# Patient Record
Sex: MALE | Race: WHITE | NOT HISPANIC OR LATINO | URBAN - NONMETROPOLITAN AREA
[De-identification: names, ages, dates, MRNs, and addresses within clinical notes are randomized per-mention and may not be internally consistent; named-entity substitution may affect disease eponyms.]

---

## 2017-01-02 PROBLEM — E11.3293: Noted: 2017-01-02

## 2017-01-02 PROBLEM — H25.13: Noted: 2020-01-10

## 2017-01-02 PROBLEM — H52.4: Noted: 2017-01-02

## 2019-02-07 ENCOUNTER — IMPORTED ENCOUNTER (OUTPATIENT)
Dept: URBAN - NONMETROPOLITAN AREA CLINIC 1 | Facility: CLINIC | Age: 64
End: 2019-02-07

## 2019-02-07 PROCEDURE — 92014 COMPRE OPH EXAM EST PT 1/>: CPT

## 2019-02-07 PROCEDURE — 92015 DETERMINE REFRACTIVE STATE: CPT

## 2019-02-07 NOTE — PATIENT DISCUSSION
*[facility] RX:.-	  A new spectacle prescription was created and dispensed today. DM with mild NPDR-Stressed the importance of keeping blood sugars under control and regular visits with PCP. -Explained the possible effects of poorly controlled diabetes and the damage that diabetes can cause to ocular health. -Pt instructed to contact our office with any vision changes.

## 2020-01-10 ENCOUNTER — IMPORTED ENCOUNTER (OUTPATIENT)
Dept: URBAN - NONMETROPOLITAN AREA CLINIC 1 | Facility: CLINIC | Age: 65
End: 2020-01-10

## 2020-01-10 PROCEDURE — 92014 COMPRE OPH EXAM EST PT 1/>: CPT

## 2020-01-10 PROCEDURE — 92015 DETERMINE REFRACTIVE STATE: CPT

## 2020-01-10 NOTE — PATIENT DISCUSSION
*Gls RX:.-	  A new spectacle prescription was created and dispensed today. DM with mild NPDR-Stressed the importance of keeping blood sugars under control and regular visits with PCP. -Explained the possible effects of poorly controlled diabetes and the damage that diabetes can cause to ocular health. -Pt instructed to contact our office with any vision changes. Cataract OU-Not yet surgical. -Reviewed symptoms of advancing cataract growth such as glare and halos and decreased vision.-Continue to monitor for now. Pt will notify us if any new symptoms develop.

## 2020-06-03 ENCOUNTER — IMPORTED ENCOUNTER (OUTPATIENT)
Dept: URBAN - NONMETROPOLITAN AREA CLINIC 1 | Facility: CLINIC | Age: 65
End: 2020-06-03

## 2020-06-03 PROBLEM — H25.13: Noted: 2020-06-03

## 2020-06-03 PROBLEM — H25.813: Noted: 2020-06-29

## 2020-06-03 PROBLEM — H25.043: Noted: 2020-06-03

## 2020-06-03 PROCEDURE — 92014 COMPRE OPH EXAM EST PT 1/>: CPT

## 2020-06-03 NOTE — PATIENT DISCUSSION
Cataract(s)-Visually significant.-Cataract(s) causing symptomatic impairment of visual function not correctable with a tolerable change in glasses or contact lenses lighting or non-operative means resulting in specific activity limitations and/or participation restrictions including but not limited to reading viewing television driving or meeting vocational or recreational needs. -Expectation is clearer vision and reduced glare disability after cataract removal.-Refer to Dr Yobany Georges for cataract evaluation

## 2020-06-29 ENCOUNTER — IMPORTED ENCOUNTER (OUTPATIENT)
Dept: URBAN - NONMETROPOLITAN AREA CLINIC 1 | Facility: CLINIC | Age: 65
End: 2020-06-29

## 2020-06-29 PROCEDURE — 92014 COMPRE OPH EXAM EST PT 1/>: CPT

## 2020-06-29 NOTE — PATIENT DISCUSSION
Cataract(s)-Visually significant cataract OU .-Cataract(s) causing symptomatic impairment of visual function not correctable with a tolerable change in glasses or contact lenses lighting or non-operative means resulting in specific activity limitations and/or participation restrictions including but not limited to reading viewing television driving or meeting vocational or recreational needs. -Expectation is clearer vision and functional improvement in symptoms as well as reduced glare disability after cataract removal.-Order IOLMaster and OPD today. -Recommend standard/traditional based on today's OPD testing and lifestyle questionnaire.-All questions were answered regarding surgery including pre and post-op medications appointments activity restrictions and anesthetic usage.-The risks benefits and alternatives and special risk factors for the patient were discussed in detail including but not limited to: bleeding infection retinal detachment vitreous loss problems with the implant and possible need for additional surgery.-Although rare the possibility of complete vision loss was discussed.-The possible need for glasses post-operatively was discussed.-Order medical clearance exam based on history of HTN cardiacl-Patient elects to proceed with complex cataract surgery OD . Will schedule at patient's convenience and re-evaluate OS  in the future. Immerion OD. Postop inflamation anticipated discussed Dextenza insertion after surgery.

## 2020-07-24 PROBLEM — H25.813: Noted: 2020-07-24

## 2020-07-24 PROBLEM — H25.043: Noted: 2020-07-24

## 2020-07-28 ENCOUNTER — IMPORTED ENCOUNTER (OUTPATIENT)
Dept: URBAN - NONMETROPOLITAN AREA CLINIC 1 | Facility: CLINIC | Age: 65
End: 2020-07-28

## 2020-07-28 PROBLEM — E78.5: Noted: 2020-07-28

## 2020-07-28 PROBLEM — Z01.818: Noted: 2020-07-28

## 2020-07-28 PROBLEM — I25.10: Noted: 2020-07-28

## 2020-07-28 PROBLEM — H25.813: Noted: 2020-07-28

## 2020-07-28 PROBLEM — I10: Noted: 2020-07-28

## 2020-08-10 ENCOUNTER — IMPORTED ENCOUNTER (OUTPATIENT)
Dept: URBAN - NONMETROPOLITAN AREA CLINIC 1 | Facility: CLINIC | Age: 65
End: 2020-08-10

## 2020-08-10 PROBLEM — H25.813: Noted: 2020-08-10

## 2020-08-10 PROBLEM — I25.10: Noted: 2020-08-10

## 2020-08-10 PROBLEM — Z98.41: Noted: 2020-08-10

## 2020-08-10 PROBLEM — E78.5: Noted: 2020-08-10

## 2020-08-10 PROBLEM — I10: Noted: 2020-08-10

## 2020-08-10 PROCEDURE — 99024 POSTOP FOLLOW-UP VISIT: CPT

## 2020-08-10 PROCEDURE — 66982 XCAPSL CTRC RMVL CPLX WO ECP: CPT

## 2020-08-10 PROCEDURE — 92136 OPHTHALMIC BIOMETRY: CPT

## 2020-08-10 PROCEDURE — 0356T INSERTION OF DRUG-ELUTING IMPLANT (INCLUDING PUNCTAL DILATION AND IMPLANT REMOVAL WHEN PERFORMED) INTO LACRIMAL CANALICULUS, EACH: CPT

## 2020-08-10 NOTE — PATIENT DISCUSSION
s/p PCIOL Stand/Trad IOL OD 8/10/20 JS-Pt doing well s/p PCIOL. -Continue post-op gtts according to instruction sheet and sleep with eye shield over eye for 7 nights.-Avoid bending at the waist lifting anything over 5lbs and dirty or melissa environments. -RTC 1wk.

## 2020-08-12 ENCOUNTER — IMPORTED ENCOUNTER (OUTPATIENT)
Dept: URBAN - NONMETROPOLITAN AREA CLINIC 1 | Facility: CLINIC | Age: 65
End: 2020-08-12

## 2020-08-12 PROBLEM — Z98.41: Noted: 2020-08-10

## 2020-08-12 PROBLEM — H25.812: Noted: 2020-08-12

## 2020-08-12 PROCEDURE — 99024 POSTOP FOLLOW-UP VISIT: CPT

## 2020-08-28 ENCOUNTER — IMPORTED ENCOUNTER (OUTPATIENT)
Dept: URBAN - NONMETROPOLITAN AREA CLINIC 1 | Facility: CLINIC | Age: 65
End: 2020-08-28

## 2020-08-28 PROBLEM — Z98.41: Noted: 2020-08-28

## 2020-08-28 PROBLEM — Z98.42: Noted: 2020-08-28

## 2020-08-28 PROCEDURE — 99024 POSTOP FOLLOW-UP VISIT: CPT

## 2020-08-28 NOTE — PATIENT DISCUSSION
s/p PCIOL Stand/Trad IOL OS 8/27/20 JS-Pt doing well s/p PCIOL. -Continue post-op gtts according to instruction sheet and sleep with eye shield over eye for 7 nights.-Avoid bending at the waist lifting anything over 5lbs and dirty or melissa environments. -RTC .

## 2020-09-04 ENCOUNTER — IMPORTED ENCOUNTER (OUTPATIENT)
Dept: URBAN - NONMETROPOLITAN AREA CLINIC 1 | Facility: CLINIC | Age: 65
End: 2020-09-04

## 2020-09-04 PROCEDURE — 99024 POSTOP FOLLOW-UP VISIT: CPT

## 2021-02-10 ENCOUNTER — IMPORTED ENCOUNTER (OUTPATIENT)
Dept: URBAN - NONMETROPOLITAN AREA CLINIC 1 | Facility: CLINIC | Age: 66
End: 2021-02-10

## 2021-02-10 PROBLEM — Z96.1: Noted: 2021-02-10

## 2021-02-10 PROCEDURE — 92014 COMPRE OPH EXAM EST PT 1/>: CPT

## 2021-12-15 ENCOUNTER — IMPORTED ENCOUNTER (OUTPATIENT)
Dept: URBAN - NONMETROPOLITAN AREA CLINIC 1 | Facility: CLINIC | Age: 66
End: 2021-12-15

## 2021-12-15 PROCEDURE — 92014 COMPRE OPH EXAM EST PT 1/>: CPT

## 2021-12-15 PROCEDURE — 92015 DETERMINE REFRACTIVE STATE: CPT

## 2022-04-15 ASSESSMENT — TONOMETRY
OD_IOP_MMHG: 17
OD_IOP_MMHG: 15
OS_IOP_MMHG: 15
OD_IOP_MMHG: 16
OD_IOP_MMHG: 15
OD_IOP_MMHG: 15
OS_IOP_MMHG: 15
OS_IOP_MMHG: 17
OD_IOP_MMHG: 15
OS_IOP_MMHG: 15
OD_IOP_MMHG: 18
OD_IOP_MMHG: 17
OD_IOP_MMHG: 16
OS_IOP_MMHG: 11
OD_IOP_MMHG: 17
OS_IOP_MMHG: 16
OS_IOP_MMHG: 16
OS_IOP_MMHG: 17
OS_IOP_MMHG: 17

## 2022-04-15 ASSESSMENT — VISUAL ACUITY
OS_CC: 20/400 BLURRY
OD_CC: 20/20
OS_CC: 20/60
OS_SC: CF4'
OD_PH: 20/30
OS_CC: 20/40
OD_SC: 20/40-2
OD_CC: 20/200
OD_CC: 20/70+2
OS_SC: 20/60
OD_SC: CF 4'
OD_CC: 20/50+2
OD_CC: 20/20
OD_PH: 20/40
OS_CC: 20/25-1
OD_CC: 20/40
OS_SC: 20/60-1
OS_CC: 20/20
OD_CC: CF4'
OS_PH: 20/50
OS_AM: 20/25
OD_PAM: 20/200
OS_AM: 20/25
OS_CC: 20/20
OD_CC: 20/20

## 2022-07-29 ENCOUNTER — EMERGENCY VISIT (OUTPATIENT)
Dept: RURAL CLINIC 1 | Facility: CLINIC | Age: 67
End: 2022-07-29

## 2022-07-29 DIAGNOSIS — H10.013: ICD-10-CM

## 2022-07-29 PROCEDURE — 99213 OFFICE O/P EST LOW 20 MIN: CPT

## 2022-07-29 ASSESSMENT — VISUAL ACUITY
OD_SC: 20/20
OS_SC: 20/20-2